# Patient Record
Sex: FEMALE | Race: WHITE | Employment: UNEMPLOYED | URBAN - METROPOLITAN AREA
[De-identification: names, ages, dates, MRNs, and addresses within clinical notes are randomized per-mention and may not be internally consistent; named-entity substitution may affect disease eponyms.]

---

## 2019-05-08 ENCOUNTER — OFFICE VISIT (OUTPATIENT)
Dept: ORTHOPEDIC SURGERY | Age: 79
End: 2019-05-08
Payer: MEDICARE

## 2019-05-08 VITALS
BODY MASS INDEX: 36.44 KG/M2 | HEART RATE: 76 BPM | WEIGHT: 198 LBS | DIASTOLIC BLOOD PRESSURE: 74 MMHG | SYSTOLIC BLOOD PRESSURE: 128 MMHG | HEIGHT: 62 IN

## 2019-05-08 DIAGNOSIS — M17.12 ARTHRITIS OF LEFT KNEE: ICD-10-CM

## 2019-05-08 DIAGNOSIS — M25.562 LEFT KNEE PAIN, UNSPECIFIED CHRONICITY: Primary | ICD-10-CM

## 2019-05-08 PROCEDURE — G8419 CALC BMI OUT NRM PARAM NOF/U: HCPCS | Performed by: ORTHOPAEDIC SURGERY

## 2019-05-08 PROCEDURE — 1036F TOBACCO NON-USER: CPT | Performed by: ORTHOPAEDIC SURGERY

## 2019-05-08 PROCEDURE — 99203 OFFICE O/P NEW LOW 30 MIN: CPT | Performed by: ORTHOPAEDIC SURGERY

## 2019-05-08 PROCEDURE — 4040F PNEUMOC VAC/ADMIN/RCVD: CPT | Performed by: ORTHOPAEDIC SURGERY

## 2019-05-08 PROCEDURE — G8427 DOCREV CUR MEDS BY ELIG CLIN: HCPCS | Performed by: ORTHOPAEDIC SURGERY

## 2019-05-08 PROCEDURE — 1123F ACP DISCUSS/DSCN MKR DOCD: CPT | Performed by: ORTHOPAEDIC SURGERY

## 2019-05-08 PROCEDURE — G8400 PT W/DXA NO RESULTS DOC: HCPCS | Performed by: ORTHOPAEDIC SURGERY

## 2019-05-08 PROCEDURE — 1090F PRES/ABSN URINE INCON ASSESS: CPT | Performed by: ORTHOPAEDIC SURGERY

## 2019-05-08 NOTE — PROGRESS NOTES
The patient returns with a new problem. She is more than 4 years from a right total knee arthroplasty was doing very well with this. She is about a month ago she was going up steps wrong step and twisted her knee she says is quite sore when she made this appointment but is gotten much better to where she feels she is back to her preinjury state. She is not getting catching locking or giving way. She is taking nothing for this. ROS: Pertinent items are noted in HPI. No notes on file    Past Medical History:  No date: Diabetes ([facility] [address]  No date: Hypertension     History reviewed. No pertinent surgical history. History reviewed. No pertinent family history. Social History    Socioeconomic History      Marital status:        Spouse name: None      Number of children: None      Years of education: None      Highest education level: None    Occupational History      None    Social Needs      Financial resource strain: None      Food insecurity:        Worry: None        Inability: None      Transportation needs:        Medical: None        Non-medical: None    Tobacco Use      Smoking status: Never Smoker      Smokeless tobacco: Never Used    Substance and Sexual Activity      Alcohol use: None      Drug use: None      Sexual activity: None    Lifestyle      Physical activity:        Days per week: None        Minutes per session: None      Stress: None    Relationships      Social connections:        Talks on phone: None        Gets together: None        Attends Jew service: None        Active member of club or organization: None        Attends meetings of clubs or organizations: None        Relationship status: None      Intimate partner violence:        Fear of current or ex partner: None        Emotionally abused: None        Physically abused: None        Forced sexual activity: None    Other Topics      Concerns:        None    Social History Narrative      None      Current Outpatient